# Patient Record
Sex: FEMALE | Race: WHITE | URBAN - METROPOLITAN AREA
[De-identification: names, ages, dates, MRNs, and addresses within clinical notes are randomized per-mention and may not be internally consistent; named-entity substitution may affect disease eponyms.]

---

## 2017-01-04 ENCOUNTER — TELEPHONE (OUTPATIENT)
Dept: FAMILY MEDICINE | Facility: CLINIC | Age: 82
End: 2017-01-04

## 2017-01-04 NOTE — TELEPHONE ENCOUNTER
Forms received from [employer] for Isela Menjivar MD.  Forms placed in provider 'sign me' folder.  Please fax forms to [phone] after completion.    Madelaine Martinez  Patient Representative

## 2017-02-03 ENCOUNTER — MEDICAL CORRESPONDENCE (OUTPATIENT)
Dept: HEALTH INFORMATION MANAGEMENT | Facility: CLINIC | Age: 82
End: 2017-02-03

## 2017-02-03 ENCOUNTER — TELEPHONE (OUTPATIENT)
Dept: FAMILY MEDICINE | Facility: CLINIC | Age: 82
End: 2017-02-03

## 2017-02-03 NOTE — TELEPHONE ENCOUNTER
Forms received from [employer] for Ellie Matthews MD.  Forms placed in provider 'sign me' folder.  Please fax forms to [phone] after completion.    Selma Sahu,

## 2017-03-08 ENCOUNTER — TELEPHONE (OUTPATIENT)
Dept: FAMILY MEDICINE | Facility: CLINIC | Age: 82
End: 2017-03-08

## 2017-03-08 DIAGNOSIS — G47.00 INSOMNIA, UNSPECIFIED TYPE: ICD-10-CM

## 2017-03-08 DIAGNOSIS — F23 BRIEF PSYCHOTIC DISORDER (H): ICD-10-CM

## 2017-03-08 DIAGNOSIS — R35.0 URINARY FREQUENCY: Primary | ICD-10-CM

## 2017-03-08 NOTE — TELEPHONE ENCOUNTER
Reason for Call: Request for an order or referral:    Order or referral being requested: Lab for UTI    Date needed: as soon as possible    Has the patient been seen by the PCP for this problem? YES    Additional comments: Daughter states she will come by the clinic to  a specimen kit for her mother.  Also, Angelia is wondering if the patient's Trazodone can be increased?    Phone number Patient can be reached at:  Cell number on file:    Telephone Information:   Mobile [phone]       Best Time:  anytime    Can we leave a detailed message on this number?  YES    Call taken on 3/8/2017 at 4:30 PM by Tami Grande

## 2017-03-08 NOTE — TELEPHONE ENCOUNTER
OLANZapine (ZYPREXA) 2.5 MG tablet   2.5 mg, AT BEDTIME 0 ordered  Reorder     Summary: Take 1 tablet (2.5 mg) by mouth At Bedtime   Dose, Route, Frequency: 2.5 mg, Oral, AT BEDTIME  Start: 12/23/2016  Ord/Sold: 12/23/2016 (O)  Report  Taking:   Long-term:   Med Dose History  EditDiscontinue       Patient Sig: Take 1 tablet (2.5 mg) by mouth At Bedtime       Ordered on: 12/23/2016       Authorized by: NAHID BACON       Dispense: 30 tablet        Last Office Visit with [facility], [facility] or [facility] prescribing provider: 11-4-2016  Future Office visit:       Routing refill request to provider for review/approval because:  Medication is reported/historical          traZODone (DESYREL) 50 MG tablet   75 mg, AT BEDTIME PRN 0 ordered  Edit     Summary: Take 1.5 tablets (75 mg) by mouth nightly as needed for sleep, Disp-135 tablet, R-0, E-Prescribe   Dose, Route, Frequency: 75 mg, Oral, AT BEDTIME PRN  Start: 11/4/2016  Ord/Sold: 11/4/2016 (O)  Report  Taking:   Long-term:   Pharmacy: [facility] [address] [place] [address] Pkwy  Med Dose History       Patient Sig: Take 1.5 tablets (75 mg) by mouth nightly as needed for sleep       Ordered on: 11/4/2016       Authorized by: NAHID BACON       Dispense: 135 tablet        Last Office Visit with [facility], [facility] or [facility] prescribing provider:  11-4-2016        Last PHQ-9 score on record=   PHQ-9 SCORE 11/4/2016   Total Score 0       Lab Results   Component Value Date    AST 14 11/04/2016     Lab Results   Component Value Date    ALT 17 11/04/2016

## 2017-03-08 NOTE — TELEPHONE ENCOUNTER
Left message for Angelia to call back to discuss symptoms  Renee Cruz,Clinic Rn  [place]

## 2017-03-09 RX ORDER — TRAZODONE HYDROCHLORIDE 50 MG/1
TABLET, FILM COATED ORAL
Qty: 135 TABLET | Refills: 0 | Status: SHIPPED | OUTPATIENT
Start: 2017-03-09 | End: 2017-06-13

## 2017-03-09 NOTE — TELEPHONE ENCOUNTER
Routing to PCP last note stated dose tapered down then to wean off. Do you wish to refill Zyprexa?  Renee Cruz,Clinic Rn  [place]

## 2017-03-09 NOTE — TELEPHONE ENCOUNTER
I am guessing she will fall out of zipnosis protocol.  I would be comfortable getting a UA and planning a phone visit with Angelia for tomorrow (as Angelia can often better represent patient's symptoms).  And then we can discuss medication at that time as well.  How does that sound?  Does Angelia want to come  a UA cup and then collect sample from mom and drop it off?

## 2017-03-09 NOTE — TELEPHONE ENCOUNTER
Angelia, daughter states she is often up at night with urinary urgency, strong smelling & then not able to void much. She is pushing fluids. Informed that a visit is often required & offered an appt with PCP tomorrow but Angelia isn't able to bring her in tomorrow so I recommend zipnosis.   A refill of trazodone was sent earlier today but she is wondering if dose could be increased to 2 tablets? Or wait to see what happens with UTI & then decide?  See other encounter about zyprexa refill.    Lakesha Mak RN

## 2017-03-10 RX ORDER — OLANZAPINE 2.5 MG/1
TABLET, FILM COATED ORAL
Qty: 90 TABLET | Refills: 0 | Status: SHIPPED | OUTPATIENT
Start: 2017-03-10 | End: 2017-06-14

## 2017-03-10 NOTE — TELEPHONE ENCOUNTER
We elected to have her remain on this medication.  See other phone encounters, will need to determine when she can come back for follow up - as it has now been 4 months from her last visit.

## 2017-03-10 NOTE — TELEPHONE ENCOUNTER
See other encounter. She will  the urine cup at another [facility] as she can't make it here before closing today. Patient states she thinks she's better now.   Can check back & see if a phone visit is necessary pending results as med question was addressed in other encounter.  Lakesha Mak RN

## 2017-04-28 ENCOUNTER — TELEPHONE (OUTPATIENT)
Dept: FAMILY MEDICINE | Facility: CLINIC | Age: 82
End: 2017-04-28

## 2017-04-28 DIAGNOSIS — E03.9 ACQUIRED HYPOTHYROIDISM: ICD-10-CM

## 2017-04-28 NOTE — TELEPHONE ENCOUNTER
levothyroxine (SYNTHROID, LEVOTHROID) 112 MCG tablet   112 mcg, DAILY 1 ordered  Edit     Summary: Take 1 tablet (112 mcg) by mouth daily, Disp-90 tablet, R-1, E-Prescribe   Dose, Route, Frequency: 112 mcg, Oral, DAILY  Start: 11/7/2016  Ord/Sold: 11/7/2016 (O)  Report  Taking:   Long-term:   Pharmacy: [facility] [address] [place] [address] [address]kwy  Med Dose History       Patient Sig: Take 1 tablet (112 mcg) by mouth daily       Ordered on: 11/7/2016       Authorized by: NAHID BACON       Dispense: 90 tablet          Last Office Visit with [facility], [facility] or [facility] prescribing provider: 11-4-2016        TSH   Date Value Ref Range Status   11/04/2016 15.87 (H) 0.40 - 4.00 mU/L Final

## 2017-05-01 RX ORDER — LEVOTHYROXINE SODIUM 112 UG/1
TABLET ORAL
Qty: 30 TABLET | Refills: 0 | Status: SHIPPED | OUTPATIENT
Start: 2017-05-01 | End: 2017-05-09 | Stop reason: DRUGHIGH

## 2017-05-01 NOTE — TELEPHONE ENCOUNTER
Chart reviewed. Last TSH was elevated. She needs this repeated per Dr. Matthews. Called care facility and they can do this on-site if PCP agrees. We need to print orders and fax them to [phone] Attn Giovanna. Will route to provider to advise if this is OK, or did you want to see her in person?   RN did 30 day supply in the mean time.     Daphne Caicedo RN

## 2017-05-02 NOTE — TELEPHONE ENCOUNTER
In December, daughter was considering switching patient's care to the in house physician/provider team at her assisted living.  I am suspecting that has occurred, otherwise I would have seen her in the office.  So please determine if she has switched care providers, and if so direct pharmacy to refill with that new provider.    If not, please return to me so we can make a follow up plan

## 2017-05-04 NOTE — TELEPHONE ENCOUNTER
Giovanna nurse from [facility] is calling to check on orders for TSH.    Asked Giovanna if patient has been switched to house/ physician provider team.  Giovanna she has not.  Since Dr Matthews put her back on her OLANZapine (ZYPREXA) 2.5 MG tablet, patient has been very stable.  The only issue patient is having is weight gain.  Patient has appetite. Blood pressures are good (130s/60s-70s) 138/70.    Please fax order for TSH to [phone] ATTN: Giovanna. Lab there will draw it.    Any questions please call Giovanna/ nurse from [phone].    Madelaine Martinez

## 2017-05-04 NOTE — TELEPHONE ENCOUNTER
Called and spoke to  Angelia. She states that Tiffanie refused to switch doctors and does want Dr. Matthews to continue to be her PCP. She says that she was told by the nurse at the facility that they already got orders to do a TSH and chante it yesterday. RN unsure how they would have gotten these orders except that there are future orders in Epic so it is possible they were able to get a copy of those. Angelia says she was thinking Tiffanie should see Dr. Matthews in June, 6 months from her last visit.     Daphne Caicedo, RN

## 2017-05-05 NOTE — TELEPHONE ENCOUNTER
Okay to fax order for TSH.  And please advise patient/daughter that I would like to see patient in next 1-2 months for an interval visit

## 2017-05-05 NOTE — TELEPHONE ENCOUNTER
Faxed order.  Notified daughter but she wanted wait to schedule. Informed PCP books out about 3 weeks.     Lakesha Mka RN

## 2017-05-08 ENCOUNTER — TRANSFERRED RECORDS (OUTPATIENT)
Dept: HEALTH INFORMATION MANAGEMENT | Facility: CLINIC | Age: 82
End: 2017-05-08

## 2017-05-08 ENCOUNTER — TELEPHONE (OUTPATIENT)
Dept: FAMILY MEDICINE | Facility: CLINIC | Age: 82
End: 2017-05-08

## 2017-05-08 LAB — TSH SERPL-ACNC: 16.09 UIU/ML (ref 0.3–5)

## 2017-05-08 NOTE — TELEPHONE ENCOUNTER
PO received.  Given to Kalpesh Umanzor RN for med rec.  Please give to provider for review and signature upon completion.    Fax to: [phone]      Madelaine Martinez  Patient Representative

## 2017-05-09 ENCOUNTER — TELEPHONE (OUTPATIENT)
Dept: FAMILY MEDICINE | Facility: CLINIC | Age: 82
End: 2017-05-09

## 2017-05-09 DIAGNOSIS — E03.9 ACQUIRED HYPOTHYROIDISM: ICD-10-CM

## 2017-05-09 RX ORDER — LEVOTHYROXINE SODIUM 125 UG/1
125 TABLET ORAL DAILY
Qty: 90 TABLET | Refills: 1 | Status: SHIPPED | OUTPATIENT
Start: 2017-05-09 | End: 2018-03-23 | Stop reason: DRUGHIGH

## 2017-05-09 NOTE — TELEPHONE ENCOUNTER
Please contact patient's daughter (And find out if we should also discuss with patient or if daughter wants to relay info to patient) and then please also update medications at nursing home.    Patient's TSH (faxed from nursing home) has come back high at 16.09.  This means that her dosage of medication is too low.  I have sent an increased dose (125mg) to her listed pharmacy.  We should recheck her TSH level in 3 months.

## 2017-05-09 NOTE — TELEPHONE ENCOUNTER
Daughter returned phone call to JABARI PALACIOS. She can be reached at [phone].    Miquel Olivier RN

## 2017-05-10 NOTE — TELEPHONE ENCOUNTER
Since you have not reached the daughter, it is reasonable to contact the patient herself to discuss and let her know.

## 2017-05-10 NOTE — TELEPHONE ENCOUNTER
Left a detailed message on daughter's cell phone informing her of her mother's increased Levothyroxine dose.  Prescription sent to her pharmacy.  I asked daughter to call us back if she would like us to call her mother. Informed her that I will call [facility] to let them know.    Spoke to RN at [facility] regarding new orders.  Faxed new orders for Levothyroxine and Lab test in 3 months.    Will keep chart open in case daughter calls back. May close after 24 hours.    Duncan Chiang, RN

## 2017-06-13 ENCOUNTER — OFFICE VISIT (OUTPATIENT)
Dept: FAMILY MEDICINE | Facility: CLINIC | Age: 82
End: 2017-06-13
Payer: COMMERCIAL

## 2017-06-13 VITALS
SYSTOLIC BLOOD PRESSURE: 132 MMHG | TEMPERATURE: 97.5 F | HEART RATE: 72 BPM | HEIGHT: 66 IN | DIASTOLIC BLOOD PRESSURE: 68 MMHG

## 2017-06-13 DIAGNOSIS — F44.5 PSEUDOSEIZURES: ICD-10-CM

## 2017-06-13 DIAGNOSIS — Z13.6 CARDIOVASCULAR SCREENING; LDL GOAL LESS THAN 130: ICD-10-CM

## 2017-06-13 DIAGNOSIS — R63.5 WEIGHT GAIN: ICD-10-CM

## 2017-06-13 DIAGNOSIS — Z86.2 HISTORY OF ANEMIA: ICD-10-CM

## 2017-06-13 DIAGNOSIS — E03.9 ACQUIRED HYPOTHYROIDISM: ICD-10-CM

## 2017-06-13 DIAGNOSIS — G47.00 INSOMNIA, UNSPECIFIED TYPE: ICD-10-CM

## 2017-06-13 DIAGNOSIS — I10 HYPERTENSION GOAL BP (BLOOD PRESSURE) < 140/90: Primary | ICD-10-CM

## 2017-06-13 LAB
ERYTHROCYTE [DISTWIDTH] IN BLOOD BY AUTOMATED COUNT: 12.2 % (ref 10–15)
HCT VFR BLD AUTO: 35.5 % (ref 35–47)
HGB BLD-MCNC: 11.5 G/DL (ref 11.7–15.7)
MCH RBC QN AUTO: 31.7 PG (ref 26.5–33)
MCHC RBC AUTO-ENTMCNC: 32.4 G/DL (ref 31.5–36.5)
MCV RBC AUTO: 98 FL (ref 78–100)
PLATELET # BLD AUTO: 231 10E9/L (ref 150–450)
RBC # BLD AUTO: 3.63 10E12/L (ref 3.8–5.2)
WBC # BLD AUTO: 8.2 10E9/L (ref 4–11)

## 2017-06-13 PROCEDURE — 80076 HEPATIC FUNCTION PANEL: CPT | Performed by: FAMILY MEDICINE

## 2017-06-13 PROCEDURE — 84439 ASSAY OF FREE THYROXINE: CPT | Performed by: FAMILY MEDICINE

## 2017-06-13 PROCEDURE — 80061 LIPID PANEL: CPT | Performed by: FAMILY MEDICINE

## 2017-06-13 PROCEDURE — 82728 ASSAY OF FERRITIN: CPT | Performed by: FAMILY MEDICINE

## 2017-06-13 PROCEDURE — 36415 COLL VENOUS BLD VENIPUNCTURE: CPT | Performed by: FAMILY MEDICINE

## 2017-06-13 PROCEDURE — 85027 COMPLETE CBC AUTOMATED: CPT | Performed by: FAMILY MEDICINE

## 2017-06-13 PROCEDURE — 84443 ASSAY THYROID STIM HORMONE: CPT | Performed by: FAMILY MEDICINE

## 2017-06-13 PROCEDURE — 99214 OFFICE O/P EST MOD 30 MIN: CPT | Performed by: FAMILY MEDICINE

## 2017-06-13 RX ORDER — TRAZODONE HYDROCHLORIDE 50 MG/1
100 TABLET, FILM COATED ORAL AT BEDTIME
Qty: 180 TABLET | Refills: 1 | Status: SHIPPED | OUTPATIENT
Start: 2017-06-13 | End: 2017-12-06

## 2017-06-13 NOTE — PROGRESS NOTES
SUBJECTIVE:                                                    Tiffanie Liriano is a 88 year old female who presents to clinic today with her daughter Angelia for the following health issues:  Angelia provides most of the history.    Hypothyroidism Follow-up      Since last visit, patient describes the following symptoms: Weight stable, no hair loss, no skin changes, no constipation, no loose stools       Amount of exercise or physical activity: None    Problems taking medications regularly: No    Medication side effects: none    Diet: regular (no restrictions)    Patient continues to live in assisted living facility. She still has a lot of the same caretakers as before. She reports that she can't do very much for herself anymore. She still enjoys listening to music or books. She sometimes has trouble determining if the lights are on or off. She cannot see much motion.     Her daughter says that she does not use oxygen at night anymore. The patient decided she did not need it anymore. She is able to walk her mother with a gait belt about 20 feet. The patient reports that her balance is not very good. When she first moved to her apartment she was able to get out of bed at night to use a bedside commode but now she always requests help. She does not feel confident in her balance because of her declining vision.     The patient reports that she sees things that are not there. She can see a scene like she is watching a movie. She sees the same scene for a few hours or days. It is vivid and colorful. She reaches for things and tries to touch them because they seem so real. She sees entire scenes with people she does not recognize. She can't get rid of the visions when they are present. Some of her friends with visual challenges see similar visions but are afraid to talk about it because they don't want people to think they are seeing things. The patient reports that the visions are usually ok- not frightening. 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    Patient's daughter sees her almost every night. One of her brothers calls her every night. They have a conference call with her every Sunday night to talk about their week.     She worries sometimes that her caregivers will not return to help her. Her daughter notes that the weekends are sometimes harder because the staff is less regular and doesn't know her routine as well. With her visions- if the caregiver places her towards the vivid vision she worries that no one is going to come back and get her.     The patient reports that she is never hungry but eats because it is the right time to eat. She does not snack between meals.     BMs are not regular. She drinks cranberry juice or prune juice. Daughter reports that she tries to push fluids when she visits her mother. She does not have dysuria or polyuria. She has not had much incontinence. When she calls for an aid it sometimes takes a lot longer than she expects (30 minutes vs 10 minutes). She cannot get to the bathroom on her own.     She has trouble sleeping at night. She does doze during the day. She does not feel groggy in the morning when she wakes up.     Daughter provided much of the HPI.    Problem list and histories reviewed & adjusted, as indicated.  Additional history: as documented    Patient Active Problem List   Diagnosis     Hypothyroidism     Macular degeneration of both eyes     Muscle weakness (generalized)     Pain in joint, lower leg     Hypertension goal BP (blood pressure) < 140/90     CARDIOVASCULAR SCREENING; LDL GOAL LESS THAN 130     Advance Care Planning     Major depressive disorder, single episode, mild (H)     PNA (pneumonia)     Pseudoseizures (Non-Epileptic Events)     Spells     History reviewed. No pertinent surgical history.    Social History   Substance Use Topics     Smoking status: Never Smoker     Smokeless tobacco: Never Used     Alcohol use Yes      Comment: 1  drink a month     Family History   Problem Relation Age of Onset     HEART DISEASE Mother      Eye Disorder Mother      Macular degeneration     HEART DISEASE Father      DIABETES Father      Breast Cancer Sister      CANCER Sister      Tongue, Lung, Lymph Node     GASTROINTESTINAL DISEASE Sister      Abdominal Aneurysm     Eye Disorder Sister      Macular degerneration     Thyroid Disease Sister          Current Outpatient Prescriptions   Medication Sig Dispense Refill     levothyroxine (SYNTHROID/LEVOTHROID) 125 MCG tablet Take 1 tablet (125 mcg) by mouth daily 90 tablet 1     OLANZapine (ZYPREXA) 2.5 MG tablet TAKE ONE TABLET BY MOUTH AT BEDTIME 90 tablet 0     traZODone (DESYREL) 50 MG tablet TAKE ONE AND ONE-HALF TABLETS BY MOUTH IN THE EVENING AS NEEDED FOR SLEEP (75 MG)  TOTAL 135 tablet 0     acetaminophen (TYLENOL) 500 MG tablet Take 1-2 tablets (500-1,000 mg) by mouth every 6 hours as needed for pain 100 tablet 11     ferrous sulfate (IRON) 325 (65 FE) MG tablet Take 1 tablet (325 mg) by mouth daily (with breakfast) 30 tablet 2     STATIN NOT PRESCRIBED, INTENTIONAL, by Other route continuous prn. Patient declines  0     Allergies   Allergen Reactions     Bactrim [Sulfamethoxazole W/Trimethoprim]      Sulfa Drugs        Reviewed and updated as needed this visit by clinical staff       Reviewed and updated as needed this visit by Provider  Allergies  Meds  Problems         ROS:  Constitutional, HEENT, cardiovascular, pulmonary, gi and gu systems are negative, except as otherwise noted.    This document serves as a record of the services and decisions personally performed by NAHID BACON. It was created on his/her behalf by Ana Lora, a trained medical scribe. The creation of this document is based on the provider's statements to the medical scribe. Ana Lora, June 13, 2017 3:33 PM  OBJECTIVE:                                                    /68  Pulse 72  Temp 97.5  F  "(36.4  C) (Oral)  Ht 1.676 m (5' 6\")  Breastfeeding? No  There is no height or weight on file to calculate BMI.  GENERAL: healthy, alert and no distress  RESP: lungs clear to auscultation - no rales, rhonchi or wheezes  CV: regular rate and rhythm, normal S1 S2, no S3 or S4, no murmur, click or rub, no peripheral edema and peripheral pulses strong  NEURO: Normal strength and tone, mentation intact and speech normal  PSYCH: mentation appears normal, affect normal/bright    Diagnostic Test Results:  No results found for this or any previous visit (from the past 24 hour(s)).     ASSESSMENT/PLAN:                                                    (I10) Hypertension goal BP (blood pressure) < 140/90  (primary encounter diagnosis)  Comment: Met, BP today 132/68  Plan: Continue current medications    (E03.9) Acquired hypothyroidism  Comment: Noted weight gain  Plan: Will recheck thyroid today  adjust therapy based on labs  Recently levothyroxine was adjusted (6-7 weeks ago)    (Z13.6) CARDIOVASCULAR SCREENING; LDL GOAL LESS THAN 130  Comment: Will recheck today, LDL @ 168 on 11/04/16, although believe this is likely elevated due to olanzapine  Plan: Adjust therapy based on labs     (R68.89) Spells  Comment: olanzapine has controlled symptoms   Plan: Lipid panel reflex to direct LDL, Hepatic panel        (Albumin, ALT, AST, Bili, Alk Phos, TP)        Discussed staying on olanzapine at lowest dose.  Daughter feels this is important to control her spells and anxiety - unless lab results are concerning. And I am in agreement that goal is quality of life at this point.    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    The information in this document, created by the medical kye Lora for me, accurately reflects the services I personally performed and the decisions made by me. I have reviewed and approved this document for accuracy prior to leaving the patient care area.    Isela Matthews MD  [facility]

## 2017-06-13 NOTE — LETTER
[facility]  [address] [address] [phone]      June 16, 2017      Tiffanie Liriano  [address] [place] [id number]              Dear Tiffanie,      Iron levels are good.  And blood counts are nearly normal.   So let's continue the iron.   Thyroid levels are nearly normal. So I would recommend we continue on the same dosage of medication and recheck levels again in three months.   Cholesterol is holding steady.  Liver tests are normal.           Sincerely,    Ellie Menjivar MD/  Results for orders placed or performed in visit on 06/13/17   Lipid panel reflex to direct LDL   Result Value Ref Range    Cholesterol 233 (H) <200 mg/dL    Triglycerides 139 <150 mg/dL    HDL Cholesterol 52 >49 mg/dL    LDL Cholesterol Calculated 153 (H) <100 mg/dL    Non HDL Cholesterol 181 (H) <130 mg/dL   CBC with platelets   Result Value Ref Range    WBC 8.2 4.0 - 11.0 10e9/L    RBC Count 3.63 (L) 3.8 - 5.2 10e12/L    Hemoglobin 11.5 (L) 11.7 - 15.7 g/dL    Hematocrit 35.5 35.0 - 47.0 %    MCV 98 78 - 100 fl    MCH 31.7 26.5 - 33.0 pg    MCHC 32.4 31.5 - 36.5 g/dL    RDW 12.2 10.0 - 15.0 %    Platelet Count 231 150 - 450 10e9/L   Ferritin   Result Value Ref Range    Ferritin 291 (H) 8 - 252 ng/mL   **TSH with free T4 reflex FUTURE 2mo   Result Value Ref Range    TSH 4.34 (H) 0.40 - 4.00 mU/L   Hepatic panel (Albumin, ALT, AST, Bili, Alk Phos, TP)   Result Value Ref Range    Bilirubin Direct <0.1 0.0 - 0.2 mg/dL    Bilirubin Total 0.2 0.2 - 1.3 mg/dL    Albumin 3.8 3.4 - 5.0 g/dL    Protein Total 7.5 6.8 - 8.8 g/dL    Alkaline Phosphatase 65 40 - 150 U/L    ALT 21 0 - 50 U/L    AST 18 0 - 45 U/L   T4 free   Result Value Ref Range    T4 Free 1.12 0.76 - 1.46 ng/dL

## 2017-06-13 NOTE — PATIENT INSTRUCTIONS
[facility]   Discharged by : Loren MASSEY CMA ([facility])    Paper scripts provided to patient : none      If you have any questions regarding your visit please contact your care team:     [facility] Clinic Hours Telephone Number   [facility] [address] Dr[address] Dr.   7am-7pm Monday - Thursday   7am-5pm Fridays  [phone]   (Appointment scheduling available 24/7)   RN Line   [phone] option 2       For a Price Quote for your services, please call our [employer] Price Line at [phone].     What options do I have for visits at the clinic other than the traditional office visit?     To expand how we care for you, many of our providers are utilizing electronic visits (e-visits) and telephone visits, when medically appropriate, for interactions with their patients rather than a visit in the clinic. We also offer nurse visits for many medical concerns. Just like any other service, we will bill your insurance company for this type of visit based on time spent on the phone with your provider. Not all insurance companies cover these visits. Please check with your medical insurance if this type of visit is covered. You will be responsible for any charges that are not paid by your insurance.   E-visits via [employer]: generally incur a $35.00 fee.     Telephone visits:   Time spent on the phone: *charged based on time that is spent on the phone in increments of 10 minutes. Estimated cost:   5-10 mins $30.00   11-20 mins. $59.00   21-30 mins. $85.00     Use [employer] (secure email communication and access to your chart) to send your primary care provider a message or make an appointment. Ask someone on your Team how to sign up for [employer].     As always, Thank you for trusting us with your health care needs!      [facility]:    Scheduling Appointments  [facility]  Call: [phone]    [place]  Call: [phone]    [facility]  Call: [phone]      WHERE TO GO FOR CARE?    Clinic    Make an appointment if you:       Are sick (cold, cough, flu, sore throat, earache or in pain).       Have a small injury (sprain, small cut, burn or broken bone).       Need a physical exam, Pap smear, vaccine or prescription refill.       Have questions about your health or medicines.    To reach us:      Call [phone] ([phone]). Open 24 hours every day. (For counseling services, call [phone].)    Log into [facility] at [employer]. (Visit [employer] to create an account.) Hospital emergency room    An emergency is a serious or life- threatening problem that must be treated right away.    Call 599 or get to the hospital if you have:      Very bad or sudden:            - Chest pain or pressure         - Bleeding         - Head or belly pain         - Dizziness or trouble seeing, walking or                          Speaking      Problems breathing      Blood in your vomit or you are coughing up blood      A major injury (knocked out, loss of a finger or limb, rape, broken bone protruding from skin)    A mental health crisis. (Or call the Mental Health Crisis line at 1-826.709.2781 or Suicide Prevention Hotline at 1-713.899.6654.)    Open 24 hours every day. You don't need an appointment.     Urgent care    Visit urgent care for sickness or small injuries when the clinic is closed. You don't need an appointment. To check hours or find an urgent care near you, visit [email or web address]. Online care    Get online care from [facility] for more than 70 common problems, like colds, allergies and infections. Open 24 hours every day at:   [email or web address]   Need help deciding?    For advice about where to be seen, you may call your clinic and ask to speak with a nurse. We're here for you 24 hours every day.         If you are deaf or hard of hearing,  please let us know. We provide many free services including sign language interpreters, oral interpreters, TTYs, telephone amplifiers, note takers and written materials.

## 2017-06-13 NOTE — NURSING NOTE
"Chief Complaint   Patient presents with     Chronic Disease Management     thyroids        Initial /68  Pulse 72  Temp 97.5  F (36.4  C) (Oral)  Ht 5' 6\" (1.676 m)  Breastfeeding? No Estimated body mass index is 30.44 kg/(m^2) as calculated from the following:    Height as of 11/4/16: 5' 6\" (1.676 m).    Weight as of 11/4/16: 188 lb 9.6 oz (85.5 kg).  Medication Reconciliation: complete   Loren Simpson CMA (AAMA)      "

## 2017-06-13 NOTE — MR AVS SNAPSHOT
After Visit Summary   6/13/2017    Tiffanie Liriano    MRN: [id number]           Patient Information     Date Of Birth          11/22/1928        Visit Information        Provider Department      6/13/2017 3:00 PM Isela Matthews MD [facility]        Today's Diagnoses     Hypertension goal BP (blood pressure) < 140/90    -  1    Acquired hypothyroidism        CARDIOVASCULAR SCREENING; LDL GOAL LESS THAN 130        Spells        Weight gain        Pseudoseizures (Non-Epileptic Events)        History of anemia        Insomnia, unspecified type          Care Instructions    [facility]   Discharged by : Loren MASSEY CMA (AAMA)    Paper scripts provided to patient : none      If you have any questions regarding your visit please contact your care team:     [facility] Hours Telephone Number   [address] Dr[address] Dr.   7am-7pm Monday - Thursday   7am-5pm Fridays  [phone]   (Appointment scheduling available 24/7)   RN Line   [phone] option 2       For a Price Quote for your services, please call our Consumer Price Line at [phone].     What options do I have for visits at the clinic other than the traditional office visit?     To expand how we care for you, many of our providers are utilizing electronic visits (e-visits) and telephone visits, when medically appropriate, for interactions with their patients rather than a visit in the clinic. We also offer nurse visits for many medical concerns. Just like any other service, we will bill your insurance company for this type of visit based on time spent on the phone with your provider. Not all insurance companies cover these visits. Please check with your medical insurance if this type of visit is covered. You will be responsible for any charges that are not paid by your insurance.   E-visits via Myows: generally  incur a $35.00 fee.     Telephone visits:   Time spent on the phone: *charged based on time that is spent on the phone in increments of 10 minutes. Estimated cost:   5-10 mins $30.00   11-20 mins. $59.00   21-30 mins. $85.00     Use [employer] (secure email communication and access to your chart) to send your primary care provider a message or make an appointment. Ask someone on your Team how to sign up for [employer].     As always, Thank you for trusting us with your health care needs!      [facility]:    Scheduling Appointments  [facility]  Call: [phone]    [facility]  Call: [phone]    [facility]  Call: [phone]      WHERE TO GO FOR CARE?    Clinic    Make an appointment if you:       Are sick (cold, cough, flu, sore throat, earache or in pain).       Have a small injury (sprain, small cut, burn or broken bone).       Need a physical exam, Pap smear, vaccine or prescription refill.       Have questions about your health or medicines.    To reach us:      Call [phone] ([phone]). Open 24 hours every day. (For counseling services, call [phone].)    Log into Sefaira at [email or web address]. (Visit [email or web address] to create an account.) Hospital emergency room    An emergency is a serious or life- threatening problem that must be treated right away.    Call 260 or get to the hospital if you have:      Very bad or sudden:            - Chest pain or pressure         - Bleeding         - Head or belly pain         - Dizziness or trouble seeing, walking or                          Speaking      Problems breathing      Blood in your vomit or you are coughing up blood      A major injury (knocked out, loss of a finger or limb, rape, broken bone protruding from skin)    A mental health crisis. (Or call the Mental Health Crisis line at 1-641.827.7876 or Suicide Prevention Hotline at  "3-668-045-0448.)    Open 24 hours every day. You don't need an appointment.     Urgent care    Visit urgent care for sickness or small injuries when the clinic is closed. You don't need an appointment. To check hours or find an urgent care near you, visit [email or web address]. Online care    Get online care from [facility] for more than 70 common problems, like colds, allergies and infections. Open 24 hours every day at:   [email or web address]   Need help deciding?    For advice about where to be seen, you may call your clinic and ask to speak with a nurse. We're here for you 24 hours every day.         If you are deaf or hard of hearing, please let us know. We provide many free services including sign language interpreters, oral interpreters, TTYs, telephone amplifiers, note takers and written materials.                         Follow-ups after your visit        Who to contact     If you have questions or need follow up information about today's clinic visit or your schedule please contact [facility] directly at [phone].  Normal or non-critical lab and imaging results will be communicated to you by MyChart, letter or phone within 4 business days after the clinic has received the results. If you do not hear from us within 7 days, please contact the clinic through [employer] or phone. If you have a critical or abnormal lab result, we will notify you by phone as soon as possible.  Submit refill requests through [employer] or call your pharmacy and they will forward the refill request to us. Please allow 3 business days for your refill to be completed.          Additional Information About Your Visit        [employer] Information     [employer] lets you send messages to your doctor, view your test results, renew your prescriptions, schedule appointments and more. To sign up, go to [email or web address] . Click on \"Log in\" on the left side of the screen, which will take you to the Welcome page. Then click on \"Sign up " "Now\" on the right side of the page.     You will be asked to enter the access code listed below, as well as some personal information. Please follow the directions to create your username and password.     Your access code is: [email or web address]  Expires: 2017  3:59 PM     Your access code will  in 90 days. If you need help or a new code, please call your [facility] clinic or [phone].        Care EveryWhere ID     This is your Care EveryWhere ID. This could be used by other organizations to access your [facility] medical records  [id number]        Your Vitals Were     Pulse Temperature Height Breastfeeding?          72 97.5  F (36.4  C) (Oral) 5' 6\" (1.676 m) No         Blood Pressure from Last 3 Encounters:   17 132/68   16 126/76   16 128/60    Weight from Last 3 Encounters:   16 188 lb 9.6 oz (85.5 kg)   16 160 lb (72.6 kg)   16 165 lb 4 oz (75 kg)              We Performed the Following     **TSH with free T4 reflex FUTURE 2mo     CBC with platelets     Ferritin     Hepatic panel (Albumin, ALT, AST, Bili, Alk Phos, TP)     Lipid panel reflex to direct LDL          Today's Medication Changes          These changes are accurate as of: 17  3:59 PM.  If you have any questions, ask your nurse or doctor.               These medicines have changed or have updated prescriptions.        Dose/Directions    traZODone 50 MG tablet   Commonly known as:  DESYREL   This may have changed:  See the new instructions.   Used for:  Insomnia, unspecified type   Changed by:  Isela Matthews MD        Dose:  100 mg   Take 2 tablets (100 mg) by mouth At Bedtime   Quantity:  180 tablet   Refills:  1            Where to get your medicines      These medications were sent to [facility] [address] [place] [address]     Phone:  [phone]     traZODone 50 MG tablet                Primary Care Provider Office Phone # " Fax #    Isela Melissa Matthews [phone]       [facility] [address] [facility] [address]        Thank you!     Thank you for choosing [facility]  for your care. Our goal is always to provide you with excellent care. Hearing back from our patients is one way we can continue to improve our services. Please take a few minutes to complete the written survey that you may receive in the mail after your visit with us. Thank you!             Your Updated Medication List - Protect others around you: Learn how to safely use, store and throw away your medicines at www.disposemymeds.org.          This list is accurate as of: 6/13/17  3:59 PM.  Always use your most recent med list.                   Brand Name Dispense Instructions for use    acetaminophen 500 MG tablet    TYLENOL    100 tablet    Take 1-2 tablets (500-1,000 mg) by mouth every 6 hours as needed for pain       ferrous sulfate 325 (65 FE) MG tablet    IRON    30 tablet    Take 1 tablet (325 mg) by mouth daily (with breakfast)       levothyroxine 125 MCG tablet    SYNTHROID/LEVOTHROID    90 tablet    Take 1 tablet (125 mcg) by mouth daily       OLANZapine 2.5 MG tablet    zyPREXA    90 tablet    TAKE ONE TABLET BY MOUTH AT BEDTIME       STATIN NOT PRESCRIBED (INTENTIONAL)      by Other route continuous prn. Patient declines       traZODone 50 MG tablet    DESYREL    180 tablet    Take 2 tablets (100 mg) by mouth At Bedtime

## 2017-06-14 DIAGNOSIS — F23 BRIEF PSYCHOTIC DISORDER (H): ICD-10-CM

## 2017-06-14 LAB
ALBUMIN SERPL-MCNC: 3.8 G/DL (ref 3.4–5)
ALP SERPL-CCNC: 65 U/L (ref 40–150)
ALT SERPL W P-5'-P-CCNC: 21 U/L (ref 0–50)
AST SERPL W P-5'-P-CCNC: 18 U/L (ref 0–45)
BILIRUB DIRECT SERPL-MCNC: <0.1 MG/DL (ref 0–0.2)
BILIRUB SERPL-MCNC: 0.2 MG/DL (ref 0.2–1.3)
CHOLEST SERPL-MCNC: 233 MG/DL
FERRITIN SERPL-MCNC: 291 NG/ML (ref 8–252)
HDLC SERPL-MCNC: 52 MG/DL
LDLC SERPL CALC-MCNC: 153 MG/DL
NONHDLC SERPL-MCNC: 181 MG/DL
PROT SERPL-MCNC: 7.5 G/DL (ref 6.8–8.8)
T4 FREE SERPL-MCNC: 1.12 NG/DL (ref 0.76–1.46)
TRIGL SERPL-MCNC: 139 MG/DL
TSH SERPL DL<=0.005 MIU/L-ACNC: 4.34 MU/L (ref 0.4–4)

## 2017-06-14 NOTE — TELEPHONE ENCOUNTER
OLANZapine      Last Written Prescription Date: 3/10/17  Last Fill Quantity: 90, # refills: 0  Last Office Visit with [facility] or [facility] prescribing provider: 6/13/17       BP Readings from Last 3 Encounters:   06/13/17 132/68   11/04/16 126/76   08/19/16 128/60     Pulse Readings from Last 2 Encounters:   06/13/17 72   11/04/16 74     Lab Results   Component Value Date     11/04/2016     Lab Results   Component Value Date    WBC 8.2 06/13/2017     Lab Results   Component Value Date    RBC 3.63 06/13/2017     Lab Results   Component Value Date    HGB 11.5 06/13/2017     Lab Results   Component Value Date    HCT 35.5 06/13/2017     No components found for: MCT  Lab Results   Component Value Date    MCV 98 06/13/2017     Lab Results   Component Value Date    MCH 31.7 06/13/2017     Lab Results   Component Value Date    MCHC 32.4 06/13/2017     Lab Results   Component Value Date    RDW 12.2 06/13/2017     Lab Results   Component Value Date     06/13/2017     Lab Results   Component Value Date    CHOL 242 11/04/2016     Lab Results   Component Value Date    HDL 46 11/04/2016     Lab Results   Component Value Date     11/04/2016     Lab Results   Component Value Date    TRIG 138 11/04/2016     Lab Results   Component Value Date    CHOLHDLRATIO 4.8 07/05/2011

## 2017-06-15 ENCOUNTER — TELEPHONE (OUTPATIENT)
Dept: FAMILY MEDICINE | Facility: CLINIC | Age: 82
End: 2017-06-15

## 2017-06-15 RX ORDER — OLANZAPINE 2.5 MG/1
TABLET, FILM COATED ORAL
Qty: 90 TABLET | Refills: 0 | Status: SHIPPED | OUTPATIENT
Start: 2017-06-15 | End: 2017-08-28

## 2017-06-15 NOTE — TELEPHONE ENCOUNTER
Requested labs from JABARI Heredia to be done around 9/15 & gave our fax number & she will send us the results.  Lakesha Mak RN

## 2017-06-15 NOTE — TELEPHONE ENCOUNTER
Prescription approved per [facility] Refill Protocol.     Daphne Caicedo RN

## 2017-06-15 NOTE — TELEPHONE ENCOUNTER
I would like to recheck TSH and free T4 in 3 months.  Her nursing home has been able to draw labs for us before.  Can we ask them to draw these labs in three months?

## 2017-08-08 ENCOUNTER — TELEPHONE (OUTPATIENT)
Dept: FAMILY MEDICINE | Facility: CLINIC | Age: 82
End: 2017-08-08

## 2017-08-08 ENCOUNTER — MEDICAL CORRESPONDENCE (OUTPATIENT)
Dept: HEALTH INFORMATION MANAGEMENT | Facility: CLINIC | Age: 82
End: 2017-08-08

## 2017-08-08 NOTE — TELEPHONE ENCOUNTER
Forms received from [facility]/ physician orders for check TSH, Free T4 on 9/15/17 for Isela Menjivar MD.  Forms placed in provider 'sign me' folder.  Please fax forms to [phone] after completion.    Madelaine Martinez  Patient Representative

## 2017-08-28 DIAGNOSIS — F23 BRIEF PSYCHOTIC DISORDER (H): ICD-10-CM

## 2017-08-29 NOTE — TELEPHONE ENCOUNTER
Medication Detail      Disp Refills Start End MARYCRUZ   OLANZapine (ZYPREXA) 2.5 MG tablet 90 tablet 0 6/15/2017  No   Sig: TAKE ONE TABLET BY MOUTH AT BEDTIME   Class: E-Prescribe   Order: [id number]       Last Office Visit with G, P or [facility] prescribing provider: 6/13/2017       BP Readings from Last 3 Encounters:   06/13/17 132/68   11/04/16 126/76   08/19/16 128/60     Pulse Readings from Last 2 Encounters:   06/13/17 72   11/04/16 74     Lab Results   Component Value Date     11/04/2016     Lab Results   Component Value Date    WBC 8.2 06/13/2017     Lab Results   Component Value Date    RBC 3.63 06/13/2017     Lab Results   Component Value Date    HGB 11.5 06/13/2017     Lab Results   Component Value Date    HCT 35.5 06/13/2017     No components found for: MCT  Lab Results   Component Value Date    MCV 98 06/13/2017     Lab Results   Component Value Date    MCH 31.7 06/13/2017     Lab Results   Component Value Date    MCHC 32.4 06/13/2017     Lab Results   Component Value Date    RDW 12.2 06/13/2017     Lab Results   Component Value Date     06/13/2017     Lab Results   Component Value Date    CHOL 233 06/13/2017     Lab Results   Component Value Date    HDL 52 06/13/2017     Lab Results   Component Value Date     06/13/2017     Lab Results   Component Value Date    TRIG 139 06/13/2017     Lab Results   Component Value Date    CHOLHDLRATIO 4.8 07/05/2011

## 2017-08-30 ENCOUNTER — TELEPHONE (OUTPATIENT)
Dept: FAMILY MEDICINE | Facility: CLINIC | Age: 82
End: 2017-08-30

## 2017-08-30 RX ORDER — OLANZAPINE 2.5 MG/1
TABLET, FILM COATED ORAL
Qty: 90 TABLET | Refills: 0 | Status: SHIPPED | OUTPATIENT
Start: 2017-08-30 | End: 2017-12-06

## 2017-08-30 NOTE — TELEPHONE ENCOUNTER
[facility]/August 2017 - physician order sheet received.  Given to Kalpesh Umanzor RN for med rec.  Please give to provider for review and signature upon completion.    Fax to: [phone]      Madelaine Martinez  Patient Representative

## 2017-08-30 NOTE — TELEPHONE ENCOUNTER
Prescription approved per [facility] Refill Protocol.  Renee Cruz,Clinic Rn  [facility] [place]

## 2017-09-01 NOTE — TELEPHONE ENCOUNTER
Form faxed back to [facility].    Ellie SANDOVAL, Certified Medical Assistant (AAMA)September 1, 2017 11:05 AM

## 2017-09-15 ENCOUNTER — TRANSFERRED RECORDS (OUTPATIENT)
Dept: HEALTH INFORMATION MANAGEMENT | Facility: CLINIC | Age: 82
End: 2017-09-15

## 2017-09-15 ENCOUNTER — TELEPHONE (OUTPATIENT)
Dept: FAMILY MEDICINE | Facility: CLINIC | Age: 82
End: 2017-09-15

## 2017-09-15 DIAGNOSIS — E03.9 ACQUIRED HYPOTHYROIDISM: ICD-10-CM

## 2017-09-15 LAB — TSH SERPL-ACNC: 10.33 UIU/ML (ref 0.3–5)

## 2017-09-15 RX ORDER — LEVOTHYROXINE SODIUM 137 UG/1
137 TABLET ORAL DAILY
Qty: 90 TABLET | Refills: 1 | Status: SHIPPED | OUTPATIENT
Start: 2017-09-15

## 2017-09-15 NOTE — TELEPHONE ENCOUNTER
Please contact daughter.  TSH is up to 10.33, although free T4 is still normal.  But we should adjust levothyroxine up to 137mcg and recheck TSH in three months.    Please call orders in to assisted living - they can do labs there in three months.  Rx has been sent to pharmacy

## 2017-09-20 ENCOUNTER — TELEPHONE (OUTPATIENT)
Dept: FAMILY MEDICINE | Facility: CLINIC | Age: 82
End: 2017-09-20

## 2017-09-20 NOTE — TELEPHONE ENCOUNTER
Forms received from [facility]/ Physician order- [place] - increase levothyroxine to 137mcg PO QD and recheck TSH in 3 months (12/18/17)  for Isela Menjivar MD.  Forms placed in provider 'sign me' folder.  Please fax forms to [phone] after completion.    Madelaine Martinez  Patient Representative

## 2017-12-06 ENCOUNTER — TELEPHONE (OUTPATIENT)
Dept: FAMILY MEDICINE | Facility: CLINIC | Age: 82
End: 2017-12-06

## 2017-12-06 DIAGNOSIS — G47.00 INSOMNIA, UNSPECIFIED TYPE: ICD-10-CM

## 2017-12-06 DIAGNOSIS — F23 BRIEF PSYCHOTIC DISORDER (H): ICD-10-CM

## 2017-12-06 NOTE — TELEPHONE ENCOUNTER
OLANZapine (ZYPREXA) 2.5 MG tablet    0 ordered  Edit     Summary: TAKE ONE TABLET BY MOUTH ONCE DAILY AT BEDTIME, Disp-90 tablet, R-0, E-Prescribe   Start: 8/30/2017  Ord/Sold: 8/30/2017 (O)  Report  Taking:   Long-term:   Pharmacy: [facility] [address] [place] [address] [address]kw[address]  Med Dose History       Patient Sig: TAKE ONE TABLET BY MOUTH ONCE DAILY AT BEDTIME       Ordered on: 8/30/2017       Authorized by: NAHID BACON       Dispense: 90 tablet        traZODone (DESYREL) 50 MG tablet   100 mg, AT BEDTIME 1 ordered  Edit     Summary: Take 2 tablets (100 mg) by mouth At Bedtime, Disp-180 tablet, R-1, E-Prescribe   Dose, Route, Frequency: 100 mg, Oral, AT BEDTIME  Start: 6/13/2017  Ord/Sold: 6/13/2017 (O)  Report  Taking:   Long-term:   Pharmacy: [facility] [address] [place] [address] [address]  Med Dose History       Patient Sig: Take 2 tablets (100 mg) by mouth At Bedtime       Ordered on: 6/13/2017       Authorized by: NAHID BACON       Dispense: 180 tablet        LOV:6/13/2017

## 2017-12-08 RX ORDER — TRAZODONE HYDROCHLORIDE 50 MG/1
TABLET, FILM COATED ORAL
Qty: 180 TABLET | Refills: 1 | Status: SHIPPED | OUTPATIENT
Start: 2017-12-08

## 2017-12-08 RX ORDER — OLANZAPINE 2.5 MG/1
TABLET, FILM COATED ORAL
Qty: 30 TABLET | Refills: 0 | Status: SHIPPED | OUTPATIENT
Start: 2017-12-08

## 2017-12-08 NOTE — TELEPHONE ENCOUNTER
Due for a visit for zyprexa. Prescription approved per [facility] Refill Protocol.  Lakesha Mak RN

## 2017-12-12 NOTE — TELEPHONE ENCOUNTER
Called patient and left a VM message to schedule an appointment w/ Dr Matthews.    Madelaine Martinez

## 2017-12-22 NOTE — TELEPHONE ENCOUNTER
Called patient and let her know she needs to schedule a follow up w/ Dr Matthews.  Patient said her daughter would have to call and schedule.    Madelaine Martinez

## 2018-02-17 ENCOUNTER — RECORDS - HEALTHEAST (OUTPATIENT)
Dept: LAB | Facility: CLINIC | Age: 83
End: 2018-02-17

## 2018-02-19 LAB
ERYTHROCYTE [DISTWIDTH] IN BLOOD BY AUTOMATED COUNT: 11.8 % (ref 11–14.5)
HCT VFR BLD AUTO: 34.1 % (ref 35–47)
HGB BLD-MCNC: 11.4 G/DL (ref 12–16)
MCH RBC QN AUTO: 32.2 PG (ref 27–34)
MCHC RBC AUTO-ENTMCNC: 33.4 G/DL (ref 32–36)
MCV RBC AUTO: 96 FL (ref 80–100)
PLATELET # BLD AUTO: 193 THOU/UL (ref 140–440)
PMV BLD AUTO: 11.7 FL (ref 8.5–12.5)
RBC # BLD AUTO: 3.54 MILL/UL (ref 3.8–5.4)
TSH SERPL DL<=0.005 MIU/L-ACNC: 13.62 UIU/ML (ref 0.3–5)
WBC: 6.3 THOU/UL (ref 4–11)

## 2018-03-23 ENCOUNTER — TELEPHONE (OUTPATIENT)
Dept: FAMILY MEDICINE | Facility: CLINIC | Age: 83
End: 2018-03-23

## 2018-03-23 NOTE — TELEPHONE ENCOUNTER
Reason for Call: Request for an order or referral:    Order or referral being requested: home care: PT and OT w/ home care nurse Patricia from [facility].     Date needed: as soon as possible    Has the patient been seen by the PCP for this problem? YES    Additional comments: Marielle is calling requesting that these orders are put in epic, states that once that is done [facility] will be able to get going on placement, etc.     Phone number Patient can be reached at:  Other phone number:  [phone]* (marielle)    Best Time:  anytime    Can we leave a detailed message on this number?  YES    Call taken on 3/23/2018 at 3:06 PM by Miquel Stock

## 2018-03-23 NOTE — TELEPHONE ENCOUNTER
I have not seen patient since 6/17.   My understanding was that she was going to be transferring care to a provider that worked within her assisted living facility.    If that is not the case, then I certainly can write for home care.    It states that I have seen the patient for the problem.  But they don't state what problem they are requesting home care for?

## 2018-03-28 NOTE — TELEPHONE ENCOUNTER
Employee from patient's living facility returned call to JABARI PALACIOS. She can be reached at [phone]    Miquel Olivier RN

## 2018-03-28 NOTE — TELEPHONE ENCOUNTER
"Returned phone call. Voicemail states that it is \"Marielle\" from [facility]. Left a message to have her call back to discuss what is needed.  Duncan Chiang RN    "

## 2018-03-30 ENCOUNTER — RECORDS - HEALTHEAST (OUTPATIENT)
Dept: LAB | Facility: CLINIC | Age: 83
End: 2018-03-30

## 2018-04-02 LAB — TSH SERPL DL<=0.005 MIU/L-ACNC: 0.33 UIU/ML (ref 0.3–5)

## 2018-06-01 ENCOUNTER — RECORDS - HEALTHEAST (OUTPATIENT)
Dept: LAB | Facility: CLINIC | Age: 83
End: 2018-06-01

## 2018-06-04 LAB
ERYTHROCYTE [DISTWIDTH] IN BLOOD BY AUTOMATED COUNT: 12 % (ref 11–14.5)
HCT VFR BLD AUTO: 36.8 % (ref 35–47)
HGB BLD-MCNC: 12.1 G/DL (ref 12–16)
IRON SATN MFR SERPL: 32 % (ref 20–50)
IRON SERPL-MCNC: 89 UG/DL (ref 42–175)
MCH RBC QN AUTO: 31.9 PG (ref 27–34)
MCHC RBC AUTO-ENTMCNC: 32.9 G/DL (ref 32–36)
MCV RBC AUTO: 97 FL (ref 80–100)
PLATELET # BLD AUTO: 172 THOU/UL (ref 140–440)
PMV BLD AUTO: 10.7 FL (ref 8.5–12.5)
RBC # BLD AUTO: 3.79 MILL/UL (ref 3.8–5.4)
TIBC SERPL-MCNC: 274 UG/DL (ref 313–563)
TRANSFERRIN SERPL-MCNC: 219 MG/DL (ref 212–360)
WBC: 7.9 THOU/UL (ref 4–11)

## 2018-07-10 ENCOUNTER — TELEPHONE (OUTPATIENT)
Dept: FAMILY MEDICINE | Facility: CLINIC | Age: 83
End: 2018-07-10

## 2018-07-10 NOTE — TELEPHONE ENCOUNTER
Reason for Call: Request for an order or referral:    Order or referral being requested: Speech Therapy to Evaluate and Treat    Date needed: as soon as possible    Has the patient been seen by the PCP for this problem? Not Applicable    Additional comments: Edita from [facility] called and requested verbal orders for speech therapy to evaluate and treat.  Edita also stated that she needs the patient's most recent clinic note to home care.  Please call Edita back with requested information.    Phone number Patient can be reached at:  [phone] (Edita/[facility])    Best Time:  Anytime    Can we leave a detailed message on this number?  YES    Call taken on 7/10/2018 at 10:35 AM by Isabel Silveira

## 2018-07-10 NOTE — TELEPHONE ENCOUNTER
3/23/18 encounter says: My understanding was that she was going to be transferring care to a provider that worked within her assisted living facility. We haven't seen patient in over a year.  Left a detailed VM with the above and that patient is at [facility] and gave their number. I gave my number if there are further questions.   Lakesha Mak RN

## 2018-12-04 ENCOUNTER — RECORDS - HEALTHEAST (OUTPATIENT)
Dept: LAB | Facility: CLINIC | Age: 83
End: 2018-12-04

## 2018-12-04 LAB
C DIFF TOX B STL QL: NEGATIVE
RIBOTYPE 027/NAP1/BI: NORMAL

## 2019-03-19 ENCOUNTER — RECORDS - HEALTHEAST (OUTPATIENT)
Dept: LAB | Facility: CLINIC | Age: 84
End: 2019-03-19

## 2019-03-19 LAB
ANION GAP SERPL CALCULATED.3IONS-SCNC: 11 MMOL/L (ref 5–18)
BUN SERPL-MCNC: 33 MG/DL (ref 8–28)
CALCIUM SERPL-MCNC: 9.2 MG/DL (ref 8.5–10.5)
CHLORIDE BLD-SCNC: 104 MMOL/L (ref 98–107)
CO2 SERPL-SCNC: 22 MMOL/L (ref 22–31)
CREAT SERPL-MCNC: 1.1 MG/DL (ref 0.6–1.1)
GFR SERPL CREATININE-BSD FRML MDRD: 47 ML/MIN/1.73M2
GLUCOSE BLD-MCNC: 91 MG/DL (ref 70–125)
POTASSIUM BLD-SCNC: 4.6 MMOL/L (ref 3.5–5)
SODIUM SERPL-SCNC: 137 MMOL/L (ref 136–145)

## 2019-04-17 ENCOUNTER — RECORDS - HEALTHEAST (OUTPATIENT)
Dept: LAB | Facility: CLINIC | Age: 84
End: 2019-04-17

## 2019-04-17 LAB — TSH SERPL DL<=0.005 MIU/L-ACNC: 2.31 UIU/ML (ref 0.3–5)
